# Patient Record
Sex: MALE | Race: WHITE
[De-identification: names, ages, dates, MRNs, and addresses within clinical notes are randomized per-mention and may not be internally consistent; named-entity substitution may affect disease eponyms.]

---

## 2020-05-20 ENCOUNTER — HOSPITAL ENCOUNTER (EMERGENCY)
Dept: HOSPITAL 41 - JD.ED | Age: 55
Discharge: HOME | End: 2020-05-20
Payer: SELF-PAY

## 2020-05-20 VITALS — DIASTOLIC BLOOD PRESSURE: 98 MMHG | HEART RATE: 90 BPM | SYSTOLIC BLOOD PRESSURE: 137 MMHG

## 2020-05-20 DIAGNOSIS — N45.1: Primary | ICD-10-CM

## 2020-05-20 DIAGNOSIS — Z87.891: ICD-10-CM

## 2020-05-20 DIAGNOSIS — Z88.0: ICD-10-CM

## 2020-05-20 NOTE — EDM.PDOC
ED HPI GENERAL MEDICAL PROBLEM





- General


Chief Complaint: Genitourinary Problem


Stated Complaint: groin pain


Time Seen by Provider: 05/20/20 20:10


Source of Information: Reports: Patient


History Limitations: Reports: No Limitations





- History of Present Illness


INITIAL COMMENTS - FREE TEXT/NARRATIVE: 


55-year-old male presents to the ED with left testicular pain starting 

yesterday and gradually progressively worsened as the day went on it became 

quite severe over the last few hours.  He did sit on the testicle accidentally 

and this seemed to set the pain off worse than it was.  No associated fever 

chills.  No dysuria urgency or frequency.  No past history of any testicular 

problems.  It is constant and made worse by movement and walking.





Onset: Gradual


Onset Date: 05/19/20 (Was aware of left testicular discomfort yesterday which 

gradually worsened as the day went on while at work.)


Duration: Hour(s):, Getting Worse


Location: Reports: Other (Left testicle pain.)


Quality: Reports: Ache, Throbbing


Severity: Moderate (Throbbing)


Improves with: Reports: Rest


Worsens with: Reports: Movement


Context: Reports: Other (Spontaneous occurrence.  Mild trauma by sitting on his 

scrotum earlier today.).  Denies: Activity (She walking), Exercise, Lifting, 

Sick Contact, Trauma


Associated Symptoms: Reports: No Other Symptoms.  Denies: Nausea/Vomiting


Treatments PTA: Reports: Other (see below) (None.)


  ** Groin


Pain Score (Numeric/FACES): 8





- Related Data


 Allergies











Allergy/AdvReac Type Severity Reaction Status Date / Time


 


Penicillins Allergy  Hives Verified 05/20/20 19:51











Home Meds: 


 Home Meds





Ciprofloxacin HCl [Cipro] 500 mg PO BID #14 tablet 05/20/20 [Rx]


Doxycycline [Vibra-Tabs] 100 mg PO BID #28 tablet 05/20/20 [Rx]


predniSONE [Prednisone] 20 mg PO BID #12 tablet 05/20/20 [Rx]











Past Medical History





- Past Health History


Medical/Surgical History: Denies Medical/Surgical History





- Past Surgical History


Musculoskeletal Surgical History: Reports: Shoulder Surgery





Social & Family History





- Tobacco Use


Smoking Status *Q: Former Smoker


Used Tobacco, but Quit: Yes


Month/Year Tobacco Last Used: march 2020





- Living Situation & Occupation


Living situation: Reports: Single


Occupation: Employed





ED ROS GENERAL





- Review of Systems


Review Of Systems: See Below


Constitutional: Denies: Fever, Chills, Malaise, Weakness, Fatigue, Decreased 

Appetite, Weight Loss


HEENT: Reports: No Symptoms


Respiratory: Reports: No Symptoms, Cough


Cardiovascular: Reports: No Symptoms


Endocrine: Reports: No Symptoms


GI/Abdominal: Reports: No Symptoms


: Reports: Other


Musculoskeletal: Reports: Back Pain


Skin: Reports: No Symptoms (Patient apprised with low back pain)


Neurological: Reports: No Symptoms


Psychiatric: Reports: No Symptoms


Hematologic/Lymphatic: Reports: No Symptoms


Immunologic: Reports: No Symptoms





ED EXAM, RENAL/





- Physical Exam


Exam: See Below


Exam Limited By: No Limitations


General Appearance: Alert, WD/WN, Mild Distress, Other (Vital signs are stable 

with temperature of 36.3.  Heart rate is 90 respiratory to 16 sats are 97% on 

room air /98.)


Eye Exam: Bilateral Eye: Normal Inspection, Proptosis


GI/Abdominal: Normal Bowel Sounds, Soft, Non-Tender, No Organomegaly, No 

Abnormal Bruit, No Mass, Pelvis Stable, Other (Moderately obese.)


 (Male) Exam: No Hernia (No inguinal hernia even on invagination of the 

scrotum.), Testicular Tenderness (L) (Testicle is tender in the superior and 

midportion of the epididymis.  The testicle itself is also slightly tender to 

palpation in comparison to the right side.  The vas deferens is also mildly 

tender to palpation.  This suggest that the patient has a mild epididymal 

orchitis.  The testicle itself is round normal size without any masses.).  No: 

Scrotum Tenderness (L)


Back Exam: Normal Inspection, Full Range of Motion.  No: CVA Tenderness (L), 

CVA Tenderness (R)


Extremities: Normal Inspection, Normal Range of Motion, Non-Tender


Neurological: Alert, Oriented, CN II-XII Intact, Normal Cognition


Psychiatric: Anxious


Skin Exam: Warm ([place] anxious), Dry, Intact, Normal Color, No Rash





Course





- Vital Signs


Last Recorded V/S: 


 Last Vital Signs











Temp  36.3 C   05/20/20 19:48


 


Pulse  90   05/20/20 19:48


 


Resp  16   05/20/20 19:48


 


BP  137/98 H  05/20/20 19:48


 


Pulse Ox  97   05/20/20 19:48














- Radiology Interpretation


Free Text/Narrative:: 


55-year-old male presents to the ED with gradually worsening left testicular 

pain over the last 48 hours.  Worse particular over the last 4 5 hours.  On 

examination patient is found to have a mild left-sided epididymal orchitis 

particular involving the superior pole of the left epididymis and vas deferens.

  Treatment will be Motrin 600 mg every 6 hours for the next 4 to 5 days to 

relieve pain and inflammation.  Prednisone 20 mg twice daily for the next 6 

days with first tablet to be taken tonight.  Doxycycline 100 mg twice daily for 

the next 2 weeks to reduce infection.  This will also be taken with Cipro 500 

mg twice daily for 7 days.  Advised to follow-up in 3 to 10 days if not 

markedly improved








Departure





- Departure


Time of Disposition: 20:11


Disposition: Home, Self-Care 01


Condition: Fair


Clinical Impression: 


 Epididymitis








- Discharge Information


*PRESCRIPTION DRUG MONITORING PROGRAM REVIEWED*: Not Applicable


*COPY OF PRESCRIPTION DRUG MONITORING REPORT IN PATIENT [place]: Not Applicable


Prescriptions: 


Ciprofloxacin HCl [Cipro] 500 mg PO BID #14 tablet


Doxycycline [Vibra-Tabs] 100 mg PO BID #28 tablet


predniSONE [Prednisone] 20 mg PO BID #12 tablet


Instructions:  Epididymitis


Referrals: 


PCP,None [Primary Care Provider] - 


Forms:  ED Department Discharge


Additional Instructions: 


Valuation in the emergency room today in regards to gradual onset and then 

acute worsening of pain left testicle over the last 24 hours or more.  

Examination reveals no signs of an inguinal hernia.  The right testicle is 

within normal limits.  The left reveals acute tenderness throughout the mid and 

superior head of the epididymis which is tubing that is coiled up behind the 

testicle.  The testicle itself is also mildly tender which we call orchitis.  

The testicle itself reveals no nodules or masses to suggest cancer.  There is 

no clinical evidence of testicular torsion.  Treatment is continuing Motrin 600 

mg every 6 hours to reduce pain and inflammation.  Antibiotic is to be 

doxycycline 100 mg twice daily for the next 14 days to clear up infection with 

Cipro 500 mg twice daily for the next 7 days as well to clear up infection.  

Also Deltasone or prednisone 20 mg starting tonight and then take it with 

breakfast and supper until the medicine is done which is 6 days.  This and 

reduces inflammation and pain.  Expect marked improvement over the next 72 

hours and complete improvement over the next 10 days.  If not you should be 

reviewed.





Sepsis Event Note





- Evaluation


Sepsis Screening Result: No Definite Risk





- Focused Exam


Vital Signs: 


 Vital Signs











  Temp Pulse Resp BP Pulse Ox


 


 05/20/20 19:48  36.3 C  90  16  137/98 H  97











Date Exam was Performed: 05/20/20


Time Exam was Performed: 20:15